# Patient Record
Sex: FEMALE | Race: BLACK OR AFRICAN AMERICAN | NOT HISPANIC OR LATINO | URBAN - METROPOLITAN AREA
[De-identification: names, ages, dates, MRNs, and addresses within clinical notes are randomized per-mention and may not be internally consistent; named-entity substitution may affect disease eponyms.]

---

## 2019-01-14 ENCOUNTER — HOSPITAL ENCOUNTER (OUTPATIENT)
Dept: SURGERY | Facility: HOSPITAL | Age: 84
Setting detail: HOSPITAL OUTPATIENT SURGERY
Discharge: HOME | End: 2019-01-14
Attending: OPHTHALMOLOGY

## 2019-02-11 ENCOUNTER — HOSPITAL ENCOUNTER (OUTPATIENT)
Dept: SURGERY | Facility: HOSPITAL | Age: 84
Setting detail: HOSPITAL OUTPATIENT SURGERY
Discharge: HOME | End: 2019-02-11
Attending: OPHTHALMOLOGY

## 2020-07-01 ENCOUNTER — OFFICE VISIT CONVERTED (OUTPATIENT)
Dept: ORTHOPEDIC SURGERY | Facility: CLINIC | Age: 85
End: 2020-07-01
Attending: PHYSICIAN ASSISTANT

## 2020-08-05 ENCOUNTER — OFFICE VISIT CONVERTED (OUTPATIENT)
Dept: ORTHOPEDIC SURGERY | Facility: CLINIC | Age: 85
End: 2020-08-05
Attending: PHYSICIAN ASSISTANT

## 2020-09-16 ENCOUNTER — OFFICE VISIT CONVERTED (OUTPATIENT)
Dept: ORTHOPEDIC SURGERY | Facility: CLINIC | Age: 85
End: 2020-09-16
Attending: PHYSICIAN ASSISTANT

## 2021-05-10 NOTE — H&P
History and Physical      Patient Name: Ambika White   Patient ID: [id number]   Sex: Female   YOB: 1934        Visit Date: July 1, 2020    Provider: Мария Adhikari PA-C   Location: [facility]   Location Address: [address] [place] [address]  [id number]   Location Phone: [phone]          Chief Complaint  · Right knee arthroplasty      History Of Present Illness  Ambika White is a 85 year old /Black female who presents today to [facility].      Patient is status post right complex total knee arthroplasty with tumor prosthesis for distal femoral fracture/osteoarthritis performed 6/12/20 by Dr. Horta. Patient states moderate amount of pain. Patient is here a wheel chair. Patient is at [facility] for inpatient rehab.       Past Medical History  Ulcer         Past Surgical History  Gallbladder; Joint Surgery; Surgical Clips         Medication List  Aplisol 5 tub. unit /0.1 mL intradermal solution; Cymbalta 20 mg oral capsule,delayed release(DR/EC); donepezil 5 mg oral tablet; lisinopril 5 mg oral tablet; metoprolol succinate 25 mg oral tablet extended release 24 hr; tramadol 50 mg oral tablet         Allergy List  NO KNOWN DRUG ALLERGIES         Family Medical History  Family history of certain chronic disabling diseases; arthritis         Social History  Alcohol Use (Never); lives with children; Tobacco (Never)         Review of Systems  · Constitutional  o Denies  o : fever, chills, weight loss  · Cardiovascular  o Denies  o : chest pain, shortness of breath  · Gastrointestinal  o Denies  o : liver disease, heartburn, nausea, blood in stools  · Genitourinary  o Denies  o : painful urination, blood in urine  · Integument  o Denies  o : rash, itching  · Neurologic  o Denies  o : headache, weakness, loss of consciousness  · Musculoskeletal  o Denies  o : painful, swollen joints  · Psychiatric  o Denies  o : drug/alcohol addiction, anxiety,  "depression      Vitals  Date Time BP Position Site L\R Cuff Size HR RR TEMP (F) WT  HT  BMI kg/m2 BSA m2 O2 Sat HC       07/01/2020 01:55 PM      64 - R   170lbs 0oz 5'  6\" 27.44 1.89 92 %          Physical Examination  · Constitutional  o Appearance  o : well developed, well-nourished, no obvious deformities present  · Head and Face  o Head  o :   § Inspection  § : normocephalic  o Face  o :   § Inspection  § : no facial lesions  · Eyes  o Conjunctivae  o : conjunctivae normal  o Sclerae  o : sclerae white  · Ears, Nose, Mouth and Throat  o Ears  o :   § External Ears  § : appearance within normal limits  § Hearing  § : intact  o Nose  o :   § External Nose  § : appearance normal  · Neck  o Inspection/Palpation  o : normal appearance  o Range of Motion  o : full range of motion  · Respiratory  o Respiratory Effort  o : breathing unlabored  o Inspection of Chest  o : normal appearance  o Auscultation of Lungs  o : no audible wheezing or rales  · Cardiovascular  o Heart  o : regular rate  · Gastrointestinal  o Abdominal Examination  o : soft and non-tender  · Skin and Subcutaneous Tissue  o General Inspection  o : intact, no rashes  · Psychiatric  o General  o : Alert and oriented x3  o Judgement and Insight  o : judgment and insight intact  o Mood and Affect  o : mood normal, affect appropriate  · In Office Procedures  o View  o : AP/LATERAL/SUNRISE  o Site  o : right, knee  o Indication  o : Right knee pain  o Study  o : X-rays ordered, taken in the office, and reviewed today.  o Xray  o : stable implant  o Comparative Data  o : Comparative Data found and reviewed today   · Right Knee-Street  o Inspection  o : incision well healed without signs of infection  o Palpation  o : calf supple non tender  o ROM  o : -15 extension, 80 flexion  o Strength  o : weak extension, weak flexion   o Special Tests  o : negative Carrillo's sign  o Neurovascular  o : Full sensation, Dorsal Pedal Pulse 2+, posteriror tibialis pulse " 2+          Assessment  · Aftercare;following joint replacement     V54.81/Z47.1  · Right knee pain, unspecified chronicity     719.46/M25.561      Plan  · Orders  o Knee (Right) [facility] ([id number]) - 719.46/M25.561 - 07/01/2020  · Medications  o Medications have been Reconciled  o Transition of Care or Provider Policy  · Instructions  o Staples removed in clinic today.  o Reviewed the patient's Past Medical, Social, and Family history as well as the ROS at today's visit, no changes.  o Call or return if worsening symptoms.  o X-ray ordered, taken and reviewed at this visit.  o Follow Up in 4 weeks.   o Electronically Identified Patient Education Materials Provided Electronically     continue physical therapy for ROM             Electronically Signed by: Мария Adhikari PA-C -Author on July 1, 2020 03:00:04 PM

## 2021-05-13 NOTE — PROGRESS NOTES
Progress Note      Patient Name: Ambika White   Patient ID: [id number]   Sex: Female   YOB: 1934    Referring Provider: Sage Horta MD    Visit Date: August 5, 2020    Provider: Мария Adhikari PA-C   Location: [facility]   Location Address: [address] [place] [address]  [id number]   Location Phone: [phone]          Chief Complaint  · Right knee pain       History Of Present Illness  Ambika White is a 85 year old /Black female who presents today to [facility].      Patient is status post right complex total knee arthroplasty with tumor prosthesis for distal femoral fracture/osteoarthritis performed 6/12/20 by Dr. Horta. Patient states moderate amount of pain. Patient is here a wheel chair. Patient is at [facility] for inpatient rehab.            Past Medical History  Arthritis; Hypertension; Ulcer         Past Surgical History  Gallbladder; Joint Surgery; Surgical Clips         Medication List  Aplisol 5 tub. unit /0.1 mL intradermal solution; Cymbalta 20 mg oral capsule,delayed release(/EC); donepezil 5 mg oral tablet; lisinopril 5 mg oral tablet; metoprolol succinate 25 mg oral tablet extended release 24 hr; tramadol 50 mg oral tablet         Allergy List  NO KNOWN DRUG ALLERGIES         Family Medical History  *No Known Family History; Family history of certain chronic disabling diseases; arthritis         Social History  Alcohol Use (Never); Claustophobic (Unknown); Homemaker.; lives with children; Recreational Drug Use (Never); Retired.; Tobacco (Never);          Review of Systems  · Constitutional  o Denies  o : fever, chills, weight loss  · Cardiovascular  o Denies  o : chest pain, shortness of breath  · Gastrointestinal  o Denies  o : liver disease, heartburn, nausea, blood in stools  · Genitourinary  o Denies  o : painful urination, blood in urine  · Integument  o Denies  o : rash, itching  · Neurologic  o Denies  o : headache, weakness, loss  "of consciousness  · Musculoskeletal  o Denies  o : painful, swollen joints  · Psychiatric  o Denies  o : drug/alcohol addiction, anxiety, depression      Vitals  Date Time BP Position Site L\R Cuff Size HR RR TEMP (F) WT  HT  BMI kg/m2 BSA m2 O2 Sat HC       08/05/2020 10:52 AM      70 - R    5'  6\"   95 %          Physical Examination  · Constitutional  o Appearance  o : well developed, well-nourished, no obvious deformities present  · Head and Face  o Head  o :   § Inspection  § : normocephalic  o Face  o :   § Inspection  § : no facial lesions  · Eyes  o Conjunctivae  o : conjunctivae normal  o Sclerae  o : sclerae white  · Ears, Nose, Mouth and Throat  o Ears  o :   § External Ears  § : appearance within normal limits  § Hearing  § : intact  o Nose  o :   § External Nose  § : appearance normal  · Neck  o Inspection/Palpation  o : normal appearance  o Range of Motion  o : full range of motion  · Respiratory  o Respiratory Effort  o : breathing unlabored  o Inspection of Chest  o : normal appearance  o Auscultation of Lungs  o : no audible wheezing or rales  · Cardiovascular  o Heart  o : regular rate  · Gastrointestinal  o Abdominal Examination  o : soft and non-tender  · Skin and Subcutaneous Tissue  o General Inspection  o : intact, no rashes  · Psychiatric  o General  o : Alert and oriented x3  o Judgement and Insight  o : judgment and insight intact  o Mood and Affect  o : mood normal, affect appropriate  · Right Knee-Street  o Inspection  o : scar well healed  o Palpation  o : no medial joint line tenderness, no lateral joint line tenderness, no patellar tendon tenderness, no pain of MCL, no pain at LCL  o ROM  o : -20 extension, 90 flexion  o Strength  o : weak extension, weak flexion   o Special Tests  o : negative varus stress, negaitve valgus stress  o Neurovascular  o : Full sensation, Dorsal Pedal Pulse 2+, posteriror tibialis pulse 2+          Assessment  · Aftercare;following joint " replacement     V54.81/Z47.1  · Right knee pain, unspecified chronicity     719.46/M25.561      Plan  · Medications  o Medications have been Reconciled  o Transition of Care or Provider Policy  · Instructions  o Reviewed the patient's Past Medical, Social, and Family history as well as the ROS at today's visit, no changes.  o Call or return if worsening symptoms.  o Follow up in 6 weeks.  o Electronically Identified Patient Education Materials Provided Electronically     Continue physical therapy  Follow up 6 weeks, x ray             Electronically Signed by: Мария Adhikari PA-C -Author on August 5, 2020 11:37:39 AM  Electronically Co-signed by: Sage Horta MD -Reviewer on August 7, 2020 02:07:12 PM

## 2021-05-13 NOTE — PROGRESS NOTES
Progress Note      Patient Name: Ambika White   Patient ID: [id number]   Sex: Female   YOB: 1934    Referring Provider: Sage Horta MD    Visit Date: September 16, 2020    Provider: Мария Adhikari PA-C   Location: [facility]   Location Address: [address] [place] [address]  [id number]   Location Phone: [phone]          Chief Complaint  · Right knee pain       History Of Present Illness  Ambika White is a 86 year old /Black female who presents today to [facility].      Patient is status post right complex total knee arthroplasty with tumor prosthesis for distal femoral fracture/osteoarthritis performed 6/12/20 by Dr. Horta. Patient is weak and lethargic. Patient's family states Hospice has been called in. Patient is here in a wheel chair.       Past Medical History  Arthritis; Hypertension; Ulcer         Past Surgical History  Gallbladder; Joint Surgery; Surgical Clips         Medication List  Aplisol 5 tub. unit /0.1 mL intradermal solution; Cymbalta 20 mg oral capsule,delayed release(DR/EC); donepezil 5 mg oral tablet; lisinopril 5 mg oral tablet; metoprolol succinate 25 mg oral tablet extended release 24 hr; tramadol 50 mg oral tablet         Allergy List  NO KNOWN DRUG ALLERGIES         Family Medical History  *No Known Family History; Family history of certain chronic disabling diseases; arthritis         Social History  Alcohol Use (Never); Claustophobic (Unknown); Homemaker.; lives with children; Recreational Drug Use (Never); Retired.; Tobacco (Never);          Review of Systems  · Constitutional  o Denies  o : fever, chills, weight loss  · Cardiovascular  o Denies  o : chest pain, shortness of breath  · Gastrointestinal  o Denies  o : liver disease, heartburn, nausea, blood in stools  · Genitourinary  o Denies  o : painful urination, blood in urine  · Integument  o Denies  o : rash, itching  · Neurologic  o Denies  o : headache, weakness,  "loss of consciousness  · Musculoskeletal  o Denies  o : painful, swollen joints  · Psychiatric  o Denies  o : drug/alcohol addiction, anxiety, depression      Vitals  Date Time BP Position Site L\R Cuff Size HR RR TEMP (F) WT  HT  BMI kg/m2 BSA m2 O2 Sat HC       09/16/2020 10:44 AM          5'  6\"             Physical Examination  · Constitutional  o Appearance  o : well developed, well-nourished, no obvious deformities present  · Head and Face  o Head  o :   § Inspection  § : normocephalic  o Face  o :   § Inspection  § : no facial lesions  · Eyes  o Conjunctivae  o : conjunctivae normal  o Sclerae  o : sclerae white  · Ears, Nose, Mouth and Throat  o Ears  o :   § External Ears  § : appearance within normal limits  § Hearing  § : intact  o Nose  o :   § External Nose  § : appearance normal  · Neck  o Inspection/Palpation  o : normal appearance  o Range of Motion  o : full range of motion  · Respiratory  o Respiratory Effort  o : breathing unlabored  o Inspection of Chest  o : normal appearance  o Auscultation of Lungs  o : no audible wheezing or rales  · Cardiovascular  o Heart  o : regular rate  · Gastrointestinal  o Abdominal Examination  o : soft and non-tender  · Skin and Subcutaneous Tissue  o General Inspection  o : intact, no rashes  · Psychiatric  o General  o : Alert and oriented x3  o Judgement and Insight  o : judgment and insight intact  o Mood and Affect  o : mood normal, affect appropriate  · Right Knee-Street  o Inspection  o : scar well healed  o Palpation  o : no medial joint line tenderness, no lateral joint line tenderness  o ROM  o : full passive extension, 100 passive flexion  o Strength  o : weak extension, weak flexion   o Neurovascular  o : Full sensation, Dorsal Pedal Pulse 2+, posteriror tibialis pulse 2+          Assessment  · Aftercare;following joint replacement     V54.81/Z47.1  · Right knee pain, unspecified chronicity     719.46/M25.561      Plan  · Medications  o Medications have " been Reconciled  o Transition of Care or Provider Policy  · Instructions  o Reviewed the patient's Past Medical, Social, and Family history as well as the ROS at today's visit, no changes.  o Call or return if worsening symptoms.  o Follow Up PRN.  o Electronically Identified Patient Education Materials Provided Electronically     At this time Family wishes for her to follow up as needed             Electronically Signed by: OMEGA Cespedes-RAHUL -Author on September 16, 2020 11:19:08 AM  Electronically Co-signed by: Sage Horta MD -Reviewer on September 16, 2020 09:13:58 PM

## 2021-05-14 VITALS — HEIGHT: 66 IN

## 2021-05-15 VITALS — HEART RATE: 64 BPM | HEIGHT: 66 IN | OXYGEN SATURATION: 92 % | WEIGHT: 170 LBS | BODY MASS INDEX: 27.32 KG/M2

## 2021-05-15 VITALS — HEART RATE: 70 BPM | OXYGEN SATURATION: 95 % | HEIGHT: 66 IN
